# Patient Record
Sex: FEMALE | Race: WHITE | NOT HISPANIC OR LATINO | URBAN - METROPOLITAN AREA
[De-identification: names, ages, dates, MRNs, and addresses within clinical notes are randomized per-mention and may not be internally consistent; named-entity substitution may affect disease eponyms.]

---

## 2023-08-12 ENCOUNTER — EMERGENCY (EMERGENCY)
Facility: HOSPITAL | Age: 23
LOS: 1 days | Discharge: HOME | End: 2023-08-12
Attending: EMERGENCY MEDICINE | Admitting: EMERGENCY MEDICINE
Payer: COMMERCIAL

## 2023-08-12 VITALS
WEIGHT: 125 LBS | HEIGHT: 66 IN | TEMPERATURE: 100 F | OXYGEN SATURATION: 97 % | SYSTOLIC BLOOD PRESSURE: 110 MMHG | RESPIRATION RATE: 16 BRPM | DIASTOLIC BLOOD PRESSURE: 71 MMHG | HEART RATE: 98 BPM

## 2023-08-12 VITALS
RESPIRATION RATE: 16 BRPM | SYSTOLIC BLOOD PRESSURE: 108 MMHG | DIASTOLIC BLOOD PRESSURE: 77 MMHG | OXYGEN SATURATION: 99 % | HEART RATE: 83 BPM | TEMPERATURE: 98 F

## 2023-08-12 DIAGNOSIS — T74.21XA ADULT SEXUAL ABUSE, CONFIRMED, INITIAL ENCOUNTER: ICD-10-CM

## 2023-08-12 DIAGNOSIS — E87.6 HYPOKALEMIA: ICD-10-CM

## 2023-08-12 DIAGNOSIS — Z88.0 ALLERGY STATUS TO PENICILLIN: ICD-10-CM

## 2023-08-12 DIAGNOSIS — R07.0 PAIN IN THROAT: ICD-10-CM
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
## 2023-08-12 DIAGNOSIS — Z91.018 ALLERGY TO OTHER FOODS: ICD-10-CM

## 2023-08-12 DIAGNOSIS — R74.01 ELEVATION OF LEVELS OF LIVER TRANSAMINASE LEVELS: ICD-10-CM

## 2023-08-12 LAB
ALBUMIN SERPL ELPH-MCNC: 4.2 G/DL — SIGNIFICANT CHANGE UP (ref 3.4–5)
ALP SERPL-CCNC: 52 U/L — SIGNIFICANT CHANGE UP (ref 40–120)
ALT FLD-CCNC: 60 U/L — HIGH (ref 12–42)
AMPHET UR-MCNC: POSITIVE
ANION GAP SERPL CALC-SCNC: 12 MMOL/L — SIGNIFICANT CHANGE UP (ref 9–16)
AST SERPL-CCNC: 339 U/L — HIGH (ref 15–37)
BARBITURATES UR SCN-MCNC: NEGATIVE — SIGNIFICANT CHANGE UP
BENZODIAZ UR-MCNC: NEGATIVE — SIGNIFICANT CHANGE UP
BILIRUB SERPL-MCNC: 0.8 MG/DL — SIGNIFICANT CHANGE UP (ref 0.2–1.2)
BUN SERPL-MCNC: 12 MG/DL — SIGNIFICANT CHANGE UP (ref 7–23)
CALCIUM SERPL-MCNC: 9.2 MG/DL — SIGNIFICANT CHANGE UP (ref 8.5–10.5)
CHLORIDE SERPL-SCNC: 104 MMOL/L — SIGNIFICANT CHANGE UP (ref 96–108)
CO2 SERPL-SCNC: 24 MMOL/L — SIGNIFICANT CHANGE UP (ref 22–31)
COCAINE METAB.OTHER UR-MCNC: POSITIVE
CREAT SERPL-MCNC: 0.77 MG/DL — SIGNIFICANT CHANGE UP (ref 0.5–1.3)
EGFR: 112 ML/MIN/1.73M2 — SIGNIFICANT CHANGE UP
GLUCOSE SERPL-MCNC: 107 MG/DL — HIGH (ref 70–99)
HCG SERPL-ACNC: 1 MIU/ML — SIGNIFICANT CHANGE UP
HCT VFR BLD CALC: 38.2 % — SIGNIFICANT CHANGE UP (ref 34.5–45)
HGB BLD-MCNC: 12.5 G/DL — SIGNIFICANT CHANGE UP (ref 11.5–15.5)
HIV 1 & 2 AB SERPL IA.RAPID: SIGNIFICANT CHANGE UP
MCHC RBC-ENTMCNC: 31 PG — SIGNIFICANT CHANGE UP (ref 27–34)
MCHC RBC-ENTMCNC: 32.7 GM/DL — SIGNIFICANT CHANGE UP (ref 32–36)
MCV RBC AUTO: 94.8 FL — SIGNIFICANT CHANGE UP (ref 80–100)
METHADONE UR-MCNC: NEGATIVE — SIGNIFICANT CHANGE UP
NRBC # BLD: 0 /100 WBCS — SIGNIFICANT CHANGE UP (ref 0–0)
OPIATES UR-MCNC: NEGATIVE — SIGNIFICANT CHANGE UP
PCP SPEC-MCNC: SIGNIFICANT CHANGE UP
PCP UR-MCNC: NEGATIVE — SIGNIFICANT CHANGE UP
PLATELET # BLD AUTO: 257 K/UL — SIGNIFICANT CHANGE UP (ref 150–400)
POTASSIUM SERPL-MCNC: 3.1 MMOL/L — LOW (ref 3.5–5.3)
POTASSIUM SERPL-SCNC: 3.1 MMOL/L — LOW (ref 3.5–5.3)
PROT SERPL-MCNC: 8 G/DL — SIGNIFICANT CHANGE UP (ref 6.4–8.2)
RBC # BLD: 4.03 M/UL — SIGNIFICANT CHANGE UP (ref 3.8–5.2)
RBC # FLD: 12.7 % — SIGNIFICANT CHANGE UP (ref 10.3–14.5)
SODIUM SERPL-SCNC: 140 MMOL/L — SIGNIFICANT CHANGE UP (ref 132–145)
THC UR QL: POSITIVE
WBC # BLD: 10.33 K/UL — SIGNIFICANT CHANGE UP (ref 3.8–10.5)
WBC # FLD AUTO: 10.33 K/UL — SIGNIFICANT CHANGE UP (ref 3.8–10.5)

## 2023-08-12 PROCEDURE — 99053 MED SERV 10PM-8AM 24 HR FAC: CPT

## 2023-08-12 PROCEDURE — 99284 EMERGENCY DEPT VISIT MOD MDM: CPT

## 2023-08-12 RX ORDER — METRONIDAZOLE 500 MG
2000 TABLET ORAL ONCE
Refills: 0 | Status: COMPLETED | OUTPATIENT
Start: 2023-08-12 | End: 2023-08-12

## 2023-08-12 RX ORDER — RALTEGRAVIR 400 MG/1
400 TABLET, FILM COATED ORAL ONCE
Refills: 0 | Status: COMPLETED | OUTPATIENT
Start: 2023-08-12 | End: 2023-08-12

## 2023-08-12 RX ORDER — RALTEGRAVIR 400 MG/1
1 TABLET, FILM COATED ORAL
Qty: 60 | Refills: 0
Start: 2023-08-12 | End: 2023-09-10

## 2023-08-12 RX ORDER — CEFTRIAXONE 500 MG/1
500 INJECTION, POWDER, FOR SOLUTION INTRAMUSCULAR; INTRAVENOUS ONCE
Refills: 0 | Status: COMPLETED | OUTPATIENT
Start: 2023-08-12 | End: 2023-08-12

## 2023-08-12 RX ORDER — EMTRICITABINE AND TENOFOVIR DISOPROXIL FUMARATE 200; 300 MG/1; MG/1
1 TABLET, FILM COATED ORAL ONCE
Refills: 0 | Status: COMPLETED | OUTPATIENT
Start: 2023-08-12 | End: 2023-08-12

## 2023-08-12 RX ORDER — LEVONORGESTREL 1.5 MG/1
1.5 TABLET ORAL ONCE
Refills: 0 | Status: COMPLETED | OUTPATIENT
Start: 2023-08-12 | End: 2023-08-12

## 2023-08-12 RX ORDER — FLUCONAZOLE 150 MG/1
150 TABLET ORAL ONCE
Refills: 0 | Status: COMPLETED | OUTPATIENT
Start: 2023-08-12 | End: 2023-08-12

## 2023-08-12 RX ORDER — ONDANSETRON 8 MG/1
4 TABLET, FILM COATED ORAL ONCE
Refills: 0 | Status: COMPLETED | OUTPATIENT
Start: 2023-08-12 | End: 2023-08-12

## 2023-08-12 RX ORDER — EMTRICITABINE AND TENOFOVIR DISOPROXIL FUMARATE 200; 300 MG/1; MG/1
1 TABLET, FILM COATED ORAL
Qty: 30 | Refills: 0
Start: 2023-08-12 | End: 2023-09-10

## 2023-08-12 RX ADMIN — LEVONORGESTREL 1.5 MILLIGRAM(S): 1.5 TABLET ORAL at 07:41

## 2023-08-12 RX ADMIN — EMTRICITABINE AND TENOFOVIR DISOPROXIL FUMARATE 1 TABLET(S): 200; 300 TABLET, FILM COATED ORAL at 07:43

## 2023-08-12 RX ADMIN — Medication 2000 MILLIGRAM(S): at 07:42

## 2023-08-12 RX ADMIN — Medication 100 MILLIGRAM(S): at 07:42

## 2023-08-12 RX ADMIN — ONDANSETRON 4 MILLIGRAM(S): 8 TABLET, FILM COATED ORAL at 07:33

## 2023-08-12 RX ADMIN — FLUCONAZOLE 150 MILLIGRAM(S): 150 TABLET ORAL at 07:42

## 2023-08-12 RX ADMIN — RALTEGRAVIR 400 MILLIGRAM(S): 400 TABLET, FILM COATED ORAL at 07:43

## 2023-08-12 RX ADMIN — CEFTRIAXONE 500 MILLIGRAM(S): 500 INJECTION, POWDER, FOR SOLUTION INTRAMUSCULAR; INTRAVENOUS at 07:37
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
## 2023-08-12 NOTE — ED ADULT NURSE NOTE - NSFALLUNIVINTERV_ED_ALL_ED
Bed/Stretcher in lowest position, wheels locked, appropriate side rails in place/Call bell, personal items and telephone in reach/Instruct patient to call for assistance before getting out of bed/chair/stretcher/Non-slip footwear applied when patient is off stretcher/[place] to call system/Physically safe environment - no spills, clutter or unnecessary equipment/Purposeful proactive rounding/Room/bathroom lighting operational, light cord in reach
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
## 2023-08-12 NOTE — ED PROVIDER NOTE - PHYSICAL EXAMINATION
PE: A&Ox3, well appearing, NAD, NCAT, regular respiratory effort, moving all four extremities equally. Deferred my  exam.
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
## 2023-08-12 NOTE — ED PROVIDER NOTE - NSFOLLOWUPINSTRUCTIONS_ED_ALL_ED_FT
Sexual Assault    Sexual assault is any unwanted sexual activity that occurs without clear permission (consent) from both people. If a person does not have the mental ability to give consent, consent cannot happen. Sexual assault is never the victim's fault. No one has the right to have sexual contact with you without your consent. Various forms of sexual assault include:    Unwanted touching.  Penetration. This may include vaginal, oral, or anal penetration.  Incest.  Human sexual trafficking.  Sexual harassment.  Any form of sexual activity that occurs when a person is unable to give consent.    Sexual assault can happen to a person of any age, gender, or race. It can be committed by a stranger or by someone you know. It can include force, threats, or pressure to be involved in sexual activity that you do not want.    Sexual assault may cause health problems for the person who was assaulted, including:  Physical injuries in the genital area or other areas of the body.  Unwanted pregnancy.  STIs (sexually transmitted infections).  Psychological problems, such as:  Anxiety.  Depression.  Post-traumatic stress disorder (PTSD).    What should I do after sexual assault?  It is important to get medical care as soon as possible after a sexual assault. Your health care provider may:  Perform a physical exam.  Test for infections.  Test for pregnancy, if this applies.    You can decide whether you want to have evidence collected from your body. This evidence may be used if you choose to take legal action (press charges) at a later time. If you choose to have evidence collected, it is best to have it done as soon as possible. You may be able to ask for the evidence to be held by local authorities until you decide about taking legal action.    You should use a condom with your sexual partner, if this applies, until all of your STI tests are negative. This is usually for 3–6 months after the sexual assault.    What happens during a physical exam after sexual assault?  It is important to know your options for the sexual assault exam. You can accept or decline any part of the exam. Your health care provider can answer any questions that you have before, during, or after the exam.    During your physical exam, your health care provider may:  Ask you questions about what happened during the sexual assault.  Check your body for injuries or areas of pain.  Collect samples to test for STIs.  Collect samples from your body for evidence, if you choose to have this done. These samples may include:  Swabs.  Clothing.  Blood.  Urine.  Hair.  Material or debris that is found on or in your body.  Take photographs for documentation, if you might take legal action at a later time.  Photographs will not be taken unless you give your consent.  If photographs are taken, they will be kept safe, along with other samples that you may choose to have collected for evidence.    What medical treatment should I have after sexual assault?  In addition to performing a physical exam, your health care provider may:  Offer you emergency birth control (contraception) if you are at risk for pregnancy.  Prescribe medicines to treat or prevent STIs. You may need to have additional evaluation and testing for STIs over a period of 3–6 months after the assault.  Give you immunizations. You may need to continue to get immunizations for several months after the assault.    What types of support are available after sexual assault?  You may choose to work with a sexual assault advocate. This person may be able to provide:  Information about crime victim assistance.  Information on filing Orders for Protection and Harassment Restraining Orders.  Emotional support.  You may also choose to have counseling after a sexual assault. Your health care provider or a sexual assault advocate may be able to recommend a counselor.    Where to find more information:    National Sexual Assault Hotline   8-869-553-HOPE (3540)    www.[employer].cl.[employer].org   The National Domestic Violence Hotline   4-358-388-SAFE (6696)    www.theNanoViricides.GERS   Office on Women's Health, U.S. Department of Health and Human Services   www.womenshealth.gov     Contact a health care provider if:  If you develop any of the following symptoms after you are treated for sexual assault, see your health care provider as soon as possible:  More discharge from your penis or vagina.  A bad smell coming from your vagina, if this applies.  Burning when you urinate.  A feeling of pressure when you urinate.  Sores or blisters on your genital area.  Pain during sex.  Swelling in your neck (lymph nodes).  Pain in your abdomen.    Summary  Sexual assault is any unwanted sexual activity that occurs without clear permission (consent) from both people.  Sexual assault is never the victim's fault. No one has the right to have sexual contact with you without your consent.  It is important to get medical care as soon as possible after a sexual assault.  A sexual assault advocate can provide you with information about crime victim assistance and offer emotional support.    This information is not intended to replace advice given to you by your health care provider. Make sure you discuss any questions you have with your health care provider.

## 2023-08-12 NOTE — ED ADULT NURSE NOTE - CAS EDN DISCHARGE ASSESSMENT
Alert and oriented to person, place and time/Patient baseline mental status  Grey from [facility] arrived to speak with pt. around 9:25am/Alert and oriented to person, place and time/Patient baseline mental status
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
## 2023-08-12 NOTE — ED ADULT NURSE NOTE - CAS DISCH TRANSFER METHOD
Pt. feels safe walking home from hospital. Did not want to take car service./[employer] was called for pt. to return back to [facility] dorm/Walking

## 2023-08-12 NOTE — ED PROVIDER NOTE - PATIENT PORTAL LINK FT
You can access the FollowMyHealth Patient Portal offered by [facility] by registering at the following website: [email or web address]. By joining [employer]’s FollowMyHealth portal, you will also be able to view your health information using other applications (apps) compatible with our system.

## 2023-08-12 NOTE — ED ADULT NURSE NOTE - NSHOSCREENINGQ1_ED_ALL_ED
March 31, 2021      Patient: Lakeshia Pittman   YOB: 1995   Date of Visit: 3/31/2021       To Whom it May Concern:    Lakeshia Pittman was seen in my clinic on 3/31/2021 at 2:30 pm.    Lakeshia may return to work on Monday, 4/5/2021 without any previously mentioned restrictions.      Sincerely,     Pako Kimble MD    Medical information is confidential and cannot be disclosed without the written consent of the patient or his representative.      
No
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
## 2023-08-12 NOTE — ED PROVIDER NOTE - PROGRESS NOTE DETAILS
Patient states that she does not have to urinate and would like to be dc'd. Conservative management discussed with the patient in detail.  Close PMD follow up encouraged.  Strict ED return instructions discussed in detail and patient given the opportunity to ask any questions about their discharge diagnosis and instructions

## 2024-08-19 NOTE — ED ADULT NURSE NOTE - NSFALLCONCLUSION_ED_ALL_ED
HGB within anesthesia guidelines, no follow-up required. Labs automatically routed to ordering provider via Epic documentation.    
Universal Safety Interventions